# Patient Record
(demographics unavailable — no encounter records)

---

## 2025-07-02 NOTE — RESULTS/DATA
[Reviewed holistic approach including cooling blankets, fans, monitoring of triggers and avoiding them as possible] : Reviewed holistic approach including cooling blankets, fans, monitoring of triggers and avoiding them as possible, herbal treatments - (not studied in RCTs), acupuncture, stress management, diet & exercise, CBT, SSRIs, Veozah, Gabapentin, Hormonal Therapy.

## 2025-07-07 NOTE — HISTORY OF PRESENT ILLNESS
[Patient reported mammogram was normal] : Patient reported mammogram was normal [Patient reported breast sonogram was normal] : Patient reported breast sonogram was normal [Patient reported PAP Smear was normal] : Patient reported PAP Smear was normal [N] : Patient does not use contraception [Hot Flashes] : hot flashes [Night Sweats] : night sweats [No] : none [Options Discussed] : options discussed [Herbal Options] : herbal options [FreeTextEntry1] : GYN Annual [TextBox_4] : 45 YO F patient presents for GYN check-up exam. Chief c/o: s/p laparoscopy w/ supracervical hysterectomy of uterus w/ BS on 2/10/25 @ [facility] with Dr. Kirt Lou  [Mammogramdate] : 7/19/24 [TextBox_19] : Tc= 10.2% [BreastSonogramDate] : 7/19/24 [TextBox_25] : Tc= 10.2% [PapSmeardate] : 7/1/24 [HPVDate] : 7/1/24 [LMPDate] : 2/2025

## 2025-07-07 NOTE — DISCUSSION/SUMMARY
[FreeTextEntry1] :  GYN Annual evaluation today -pap smear sent. We discussed -- Fibrocystic breast changes, Hot flashes, Night sweats and Menopausal symptoms causes and treatment options. PLAN:  We discussed patient is status post laparoscopy with supracervical hysterectomy. Uterus and bilateral fallopian tubes are absent. Cervix and ovaries are still present. Patient reports night sweats but no hot flashes since surgery. Monitor for potential endometriosis or adenomyosis recurrence in the cervix. Recommend natural remedies for night sweats, such as Thermella supplement from [employer]. Advise to try for 3 months and follow up if symptoms persist. Patient experiencing night sweats, possibly related to hormonal changes. Previous FSH level was 12, which is within normal range. Current hormonal status unclear due to fluctuations. Ordered hormone level tests to rule out any liver or kidney issues. Discussed potential for future hormone replacement therapy if natural remedies are ineffective. Advised patient about risks and benefits of estrogen therapy. Recent A1C increased from 5.7 to 6.2. Patient concerned about potential link between hysterectomy and insulin levels. Ordered new A1C test. Encouraged continued lifestyle modifications including carbohydrate reduction. Advised patient to check results through patient portal. Will review results and adjust management plan as needed.  Patient verbalized understanding of all explanations, and her questions were answered, and all concerns were addressed. Patient was screened for depression - no signs of clinical depression. PHQ-2 on file Rx given for mammogram and B sonogram RTO in 3 months for lab results   IEhsan sole acting as scribe for Dr. Gould. 07/02/2025    The documentation recorded by the scribe, in my presence, accurately reflects the service I personally performed, and the decisions made by me with my edits as appropriate on 07/07/2025  Donna Gould MD, FACOG

## 2025-07-07 NOTE — HISTORY OF PRESENT ILLNESS
[Patient reported mammogram was normal] : Patient reported mammogram was normal [Patient reported breast sonogram was normal] : Patient reported breast sonogram was normal [Patient reported PAP Smear was normal] : Patient reported PAP Smear was normal [N] : Patient does not use contraception [Hot Flashes] : hot flashes [Night Sweats] : night sweats [No] : none [Options Discussed] : options discussed [Herbal Options] : herbal options [FreeTextEntry1] : GYN Annual [TextBox_4] : 43 YO F patient presents for GYN check-up exam. Chief c/o: s/p laparoscopy w/ supracervical hysterectomy of uterus w/ BS on 2/10/25 @ [facility] with Dr. Kirt Lou  [Mammogramdate] : 7/19/24 [TextBox_19] : Tc= 10.2% [BreastSonogramDate] : 7/19/24 [TextBox_25] : Tc= 10.2% [PapSmeardate] : 7/1/24 [HPVDate] : 7/1/24 [LMPDate] : 2/2025

## 2025-07-07 NOTE — DISCUSSION/SUMMARY
[FreeTextEntry1] :  GYN Annual evaluation today -pap smear sent. We discussed -- Fibrocystic breast changes, Hot flashes, Night sweats and Menopausal symptoms causes and treatment options. PLAN:  We discussed patient is status post laparoscopy with supracervical hysterectomy. Uterus and bilateral fallopian tubes are absent. Cervix and ovaries are still present. Patient reports night sweats but no hot flashes since surgery. Monitor for potential endometriosis or adenomyosis recurrence in the cervix. Recommend natural remedies for night sweats, such as Thermella supplement from [employer]. Advise to try for 3 months and follow up if symptoms persist. Patient experiencing night sweats, possibly related to hormonal changes. Previous FSH level was 12, which is within normal range. Current hormonal status unclear due to fluctuations. Ordered hormone level tests to rule out any liver or kidney issues. Discussed potential for future hormone replacement therapy if natural remedies are ineffective. Advised patient about risks and benefits of estrogen therapy. Recent A1C increased from 5.7 to 6.2. Patient concerned about potential link between hysterectomy and insulin levels. Ordered new A1C test. Encouraged continued lifestyle modifications including carbohydrate reduction. Advised patient to check results through patient portal. Will review results and adjust management plan as needed.  Patient verbalized understanding of all explanations, and her questions were answered, and all concerns were addressed. Patient was screened for depression - no signs of clinical depression. PHQ-2 on file Rx given for mammogram and B sonogram RTO in 3 months for lab results   IEhsan sole acting as scribe for Dr. Gould. 07/02/2025    The documentation recorded by the scribe, in my presence, accurately reflects the service I personally performed, and the decisions made by me with my edits as appropriate on 07/07/2025  Donna Gould MD, FACOG

## 2025-07-07 NOTE — PHYSICAL EXAM
[Chaperoned Physical Exam] : A chaperone was present in the examining room during all aspects of the physical examination. [MA] : MA [Appropriately responsive] : appropriately responsive [Alert] : alert [No Acute Distress] : no acute distress [No Lymphadenopathy] : no lymphadenopathy [Regular Rate Rhythm] : regular rate rhythm [No Murmurs] : no murmurs [Clear to Auscultation B/L] : clear to auscultation bilaterally [Soft] : soft [Non-tender] : non-tender [Non-distended] : non-distended [No HSM] : No HSM [No Lesions] : no lesions [No Mass] : no mass [Oriented x3] : oriented x3 [Examination Of The Breasts] : a normal appearance [Labia Majora] : normal [Labia Minora] : normal [No Bleeding] : There was no active vaginal bleeding [Normal] : normal [Absent] : absent [Uterine Adnexae] : normal [FreeTextEntry2] : Ehsan [FreeTextEntry6] : No tenderness, No nipple discharge and no adenopathy.